# Patient Record
Sex: FEMALE | Race: WHITE | HISPANIC OR LATINO | Employment: UNEMPLOYED | URBAN - METROPOLITAN AREA
[De-identification: names, ages, dates, MRNs, and addresses within clinical notes are randomized per-mention and may not be internally consistent; named-entity substitution may affect disease eponyms.]

---

## 2018-11-13 ENCOUNTER — TELEPHONE (OUTPATIENT)
Dept: ADMINISTRATIVE | Facility: HOSPITAL | Age: 38
End: 2018-11-13

## 2019-10-10 PROBLEM — K80.20 CALCULUS OF GALLBLADDER WITHOUT CHOLECYSTITIS WITHOUT OBSTRUCTION: Status: ACTIVE | Noted: 2019-10-10

## 2019-10-25 PROBLEM — K80.20 CALCULUS OF GALLBLADDER WITHOUT CHOLECYSTITIS WITHOUT OBSTRUCTION: Status: RESOLVED | Noted: 2019-10-10 | Resolved: 2019-10-25

## 2020-01-30 ENCOUNTER — PATIENT OUTREACH (OUTPATIENT)
Dept: ADMINISTRATIVE | Facility: HOSPITAL | Age: 40
End: 2020-01-30

## 2021-06-02 ENCOUNTER — HOSPITAL ENCOUNTER (OUTPATIENT)
Dept: RADIOLOGY | Facility: HOSPITAL | Age: 41
Discharge: HOME | End: 2021-06-02
Attending: PREVENTIVE MEDICINE
Payer: OTHER MISCELLANEOUS

## 2021-06-02 DIAGNOSIS — M25.511 RIGHT SHOULDER PAIN: ICD-10-CM

## 2021-06-02 DIAGNOSIS — M25.511 RIGHT SHOULDER PAIN: Primary | ICD-10-CM

## 2021-06-02 PROCEDURE — 73221 MRI JOINT UPR EXTREM W/O DYE: CPT | Mod: TC,RT

## 2022-02-16 ENCOUNTER — PATIENT MESSAGE (OUTPATIENT)
Dept: ADMINISTRATIVE | Facility: HOSPITAL | Age: 42
End: 2022-02-16
Payer: OTHER MISCELLANEOUS

## 2022-04-04 ENCOUNTER — PATIENT MESSAGE (OUTPATIENT)
Dept: ADMINISTRATIVE | Facility: HOSPITAL | Age: 42
End: 2022-04-04
Payer: OTHER MISCELLANEOUS

## 2022-07-11 ENCOUNTER — PATIENT OUTREACH (OUTPATIENT)
Dept: ADMINISTRATIVE | Facility: HOSPITAL | Age: 42
End: 2022-07-11
Payer: OTHER MISCELLANEOUS

## 2022-07-11 ENCOUNTER — PATIENT MESSAGE (OUTPATIENT)
Dept: ADMINISTRATIVE | Facility: HOSPITAL | Age: 42
End: 2022-07-11
Payer: OTHER MISCELLANEOUS

## 2022-07-27 DIAGNOSIS — Z12.31 OTHER SCREENING MAMMOGRAM: ICD-10-CM

## 2022-08-03 ENCOUNTER — HOSPITAL ENCOUNTER (EMERGENCY)
Facility: HOSPITAL | Age: 42
Discharge: HOME | End: 2022-08-03
Attending: EMERGENCY MEDICINE

## 2022-08-03 VITALS
OXYGEN SATURATION: 98 % | TEMPERATURE: 98 F | RESPIRATION RATE: 18 BRPM | DIASTOLIC BLOOD PRESSURE: 72 MMHG | HEART RATE: 88 BPM | WEIGHT: 206.38 LBS | HEIGHT: 64 IN | SYSTOLIC BLOOD PRESSURE: 111 MMHG | BODY MASS INDEX: 35.23 KG/M2

## 2022-08-03 DIAGNOSIS — L50.9 URTICARIA: Primary | ICD-10-CM

## 2022-08-03 PROCEDURE — 25000003 PHARM REV CODE 250: Performed by: EMERGENCY MEDICINE

## 2022-08-03 PROCEDURE — 99283 EMERGENCY DEPT VISIT LOW MDM: CPT

## 2022-08-03 RX ORDER — CETIRIZINE HYDROCHLORIDE 10 MG/1
10 TABLET ORAL 2 TIMES DAILY PRN
Qty: 30 TABLET | Refills: 0 | Status: SHIPPED | OUTPATIENT
Start: 2022-08-03 | End: 2022-09-19

## 2022-08-03 RX ORDER — FAMOTIDINE 20 MG/1
20 TABLET, FILM COATED ORAL 2 TIMES DAILY PRN
Qty: 30 TABLET | Refills: 0 | Status: SHIPPED | OUTPATIENT
Start: 2022-08-03 | End: 2022-09-19

## 2022-08-03 RX ORDER — CETIRIZINE HYDROCHLORIDE 10 MG/1
20 TABLET ORAL
Status: COMPLETED | OUTPATIENT
Start: 2022-08-03 | End: 2022-08-03

## 2022-08-03 RX ORDER — FAMOTIDINE 20 MG/1
20 TABLET, FILM COATED ORAL
Status: COMPLETED | OUTPATIENT
Start: 2022-08-03 | End: 2022-08-03

## 2022-08-03 RX ADMIN — FAMOTIDINE 20 MG: 20 TABLET ORAL at 11:08

## 2022-08-03 RX ADMIN — CETIRIZINE HYDROCHLORIDE 20 MG: 10 TABLET, FILM COATED ORAL at 11:08

## 2022-08-04 NOTE — ED PROVIDER NOTES
EMERGENCY DEPARTMENT HISTORY AND PHYSICAL EXAM          Date: 8/3/2022   Patient Name: Kat Issa       History of Presenting Illness           Chief Complaint   Patient presents with    Itching     Pt complains of rash and itching that began on Sunday. Pt states that the itching has affected her entire body, reports that she began taking fluconazole w/o a prescription on Sunday due do vaginal yeast.         History Provided By: Patient and Partner    2300   Kat Issa is a 42 y.o. female with PMHX of T2DM  who presents to the emergency department C/O rash.      Patient reports intermittent itchy rash on extremities and trunk for the past 3-4 days.  Comes and goes.  Patient states symptoms started after taking fluconazole for a yeast infection.  No breathing difficulty, wheezing, vomiting, or throat tightness    PCP: Osiris Mason NP        No current facility-administered medications for this encounter.     Current Outpatient Medications   Medication Sig Dispense Refill    cetirizine (ZYRTEC) 10 MG tablet Take 1 tablet (10 mg total) by mouth 2 (two) times daily as needed (Hives). 30 tablet 0    famotidine (PEPCID) 20 MG tablet Take 1 tablet (20 mg total) by mouth 2 (two) times daily as needed (Hives). 30 tablet 0    ferrous sulfate 325 (65 FE) MG EC tablet Take 1 tablet (325 mg total) by mouth 2 (two) times daily. 60 tablet 5    lovastatin (MEVACOR) 20 MG tablet Take 1 tablet (20 mg total) by mouth every evening. 90 tablet 3    metFORMIN (GLUCOPHAGE) 1000 MG tablet Take 1 tablet (1,000 mg total) by mouth 2 (two) times daily with meals. 180 tablet 3           Past History     Past Medical History:   Past Medical History:   Diagnosis Date    Diabetes mellitus, type 2     Ectopic pregnancy x2 (s/p bilateral salpingectomy)     Gallstones         Past Surgical History:   Past Surgical History:   Procedure Laterality Date    DILATION AND CURETTAGE OF UTERUS  2015    ECTOPIC PREGNANCY  "SURGERY      2    LAPAROSCOPIC CHOLECYSTECTOMY N/A 10/10/2019    Procedure: CHOLECYSTECTOMY, LAPAROSCOPIC;  Surgeon: Luis Bogran-Reyes, MD;  Location: [facility];  Service: General;  Laterality: N/A;    left salpingectomy Left 2015    2nd ruptured ectopic ([facility] op note in media)    right salpingectomy Right 2014    1st ruptured ectopic, partial right salpingectomy ([facility] op note)        Family History:   Family History   Problem Relation Age of Onset    No Known Problems Mother     No Known Problems Father         Social History:   Social History     Tobacco Use    Smoking status: Never Smoker    Smokeless tobacco: Never Used   Substance Use Topics    Alcohol use: No    Drug use: No        Allergies:   Review of patient's allergies indicates:  No Known Allergies       Review of Systems   Review of Systems   Constitutional: Negative for chills and fever.   Respiratory: Negative for shortness of breath and wheezing.    Gastrointestinal: Negative for nausea and vomiting.   Skin: Positive for rash.   All other systems reviewed and are negative.               Physical Exam     Vitals:    08/03/22 2236 08/03/22 2240 08/03/22 2307   BP:  111/72    Pulse: 88     Resp: 18     Temp: 98.2 °F (36.8 °C)     SpO2: 98%     Weight: 93.6 kg (206 lb 6.4 oz)     Height:   5' 4" (1.626 m)      Physical Exam  Vitals and nursing note reviewed. Exam conducted with a chaperone present.   Constitutional:       General: She is not in acute distress.     Appearance: Normal appearance. She is not ill-appearing.   HENT:      Head: Normocephalic and atraumatic.      Right Ear: External ear normal.      Left Ear: External ear normal.      Nose: Nose normal. No congestion or rhinorrhea.      Mouth/Throat:      Mouth: Mucous membranes are moist.   Eyes:      Conjunctiva/sclera: Conjunctivae normal.      Pupils: Pupils are equal, round, and reactive to light.   Pulmonary:      Effort: Pulmonary effort is normal. No respiratory distress. "   Musculoskeletal:         General: No deformity. Normal range of motion.      Cervical back: Normal range of motion. No rigidity.   Skin:     General: Skin is dry.      Findings: Rash present.      Comments: Faint uticaria  over bilateral upper extremities, lower abdomen and lower back   Neurological:      General: No focal deficit present.      Mental Status: She is alert and oriented to person, place, and time. Mental status is at baseline.   Psychiatric:         Mood and Affect: Mood normal.         Behavior: Behavior normal.              Diagnostic Study Results      Labs -   No results found for this or any previous visit (from the past 12 hour(s)).     Radiologic Studies -    No orders to display        Medications given in the ED-   Medications   cetirizine tablet 20 mg (20 mg Oral Given 8/3/22 2305)   famotidine tablet 20 mg (20 mg Oral Given 8/3/22 2305)           Medical Decision Making    I am the first provider for this patient.     I reviewed the vital signs, available nursing notes, past medical history, past surgical history, family history and social history.     Vital Signs:  Reviewed the patient's vital signs.     Pulse Oximetry Analysis and Interpretation:    98% on Room Air, normal        Records Reviewed: Nursing notes.        Provider Notes (Medical Decision Making): Kat Issa is a 42 y.o. female here with hives    Vital signs with normal is.  Patient has not been taking any antihistamines or treatment for this at home.  Symptoms started after taking fluconazole.  Denies recent new allergens or exposures.    Advise treatment with second-generation H1 blocker can also add H2 blocker as needed.  Advised follow-up patient's primary care provider.        Procedures:   Procedures      ED Course:               Diagnosis and Disposition     Critical Care:      DISCHARGE NOTE:       Kat Issa's  results have been reviewed with her.  She has been counseled regarding her diagnosis,  treatment, and plan.  She verbally conveys understanding and agreement of the signs, symptoms, diagnosis, treatment and prognosis and additionally agrees to follow up as discussed.  She also agrees with the care-plan and conveys that all of her questions have been answered.  I have also provided discharge instructions for her that include: educational information regarding their diagnosis and treatment, and list of reasons why they would want to return to the ED prior to their follow-up appointment, should her condition change. She has been provided with education for proper emergency department utilization.         CLINICAL IMPRESSION:           1. Urticaria              PLAN:   1. Discharge Home  2.      Medication List      START taking these medications    cetirizine 10 MG tablet  Commonly known as: ZYRTEC  Take 1 tablet (10 mg total) by mouth 2 (two) times daily as needed (Hives).     famotidine 20 MG tablet  Commonly known as: PEPCID  Take 1 tablet (20 mg total) by mouth 2 (two) times daily as needed (Hives).        ASK your doctor about these medications    ferrous sulfate 325 (65 FE) MG EC tablet  Take 1 tablet (325 mg total) by mouth 2 (two) times daily.     lovastatin 20 MG tablet  Commonly known as: MEVACOR  Take 1 tablet (20 mg total) by mouth every evening.     metFORMIN 1000 MG tablet  Commonly known as: GLUCOPHAGE  Take 1 tablet (1,000 mg total) by mouth 2 (two) times daily with meals.           Where to Get Your Medications      These medications were sent to [address]  [address] [place] [address] [address]    Phone: [phone]   · cetirizine 10 MG tablet  · famotidine 20 MG tablet        3. Osiris Mason NP  [address] [place] [id number]  [phone]    Schedule an appointment as soon as possible for a visit   Primary care follow up       _______________________________     Please note that this dictation was completed with  M*Modal, the computer voice recognition software.  Quite often unanticipated grammatical, syntax, homophones, and other interpretive errors are inadvertently transcribed by the computer software.  Please disregard these errors.  Please excuse any errors that have escaped final proofreading.             Kain Jacobo MD  08/04/22 0132

## 2022-09-14 ENCOUNTER — PATIENT OUTREACH (OUTPATIENT)
Dept: ADMINISTRATIVE | Facility: HOSPITAL | Age: 42
End: 2022-09-14

## 2022-09-14 DIAGNOSIS — E11.9 CONTROLLED TYPE 2 DIABETES MELLITUS WITHOUT COMPLICATION, WITHOUT LONG-TERM CURRENT USE OF INSULIN: Primary | ICD-10-CM

## 2022-09-15 ENCOUNTER — LAB VISIT (OUTPATIENT)
Dept: LAB | Facility: HOSPITAL | Age: 42
End: 2022-09-15
Attending: NURSE PRACTITIONER

## 2022-09-15 DIAGNOSIS — E11.9 CONTROLLED TYPE 2 DIABETES MELLITUS WITHOUT COMPLICATION, WITHOUT LONG-TERM CURRENT USE OF INSULIN: ICD-10-CM

## 2022-09-15 DIAGNOSIS — D64.9 ANEMIA, UNSPECIFIED TYPE: ICD-10-CM

## 2022-09-15 LAB
ALBUMIN/CREAT UR: NORMAL MG/MG (ref 0–30)
BASOPHILS # BLD AUTO: 0.08 K/UL (ref 0–0.2)
BASOPHILS NFR BLD: 1.2 % (ref 0–1.9)
BILIRUB UR QL STRIP: NEGATIVE
CLARITY UR: CLEAR
COLOR UR: YELLOW
CREAT UR-MCNC: 102 MG/DL (ref 15–325)
DIFFERENTIAL METHOD: ABNORMAL
EOSINOPHIL # BLD AUTO: 0.3 K/UL (ref 0–0.5)
EOSINOPHIL NFR BLD: 3.7 % (ref 0–8)
ERYTHROCYTE [DISTWIDTH] IN BLOOD BY AUTOMATED COUNT: 17.2 % (ref 11.5–14.5)
ESTIMATED AVG GLUCOSE: 134 MG/DL (ref 68–131)
FERRITIN SERPL-MCNC: 8 NG/ML (ref 20–300)
FOLATE SERPL-MCNC: 28.4 NG/ML (ref 4–24)
GLUCOSE UR QL STRIP: NEGATIVE
HBA1C MFR BLD: 6.3 % (ref 4–5.6)
HCT VFR BLD AUTO: 31.2 % (ref 37–48.5)
HGB BLD-MCNC: 9.4 G/DL (ref 12–16)
HGB UR QL STRIP: NEGATIVE
IMM GRANULOCYTES # BLD AUTO: 0.01 K/UL (ref 0–0.04)
IMM GRANULOCYTES NFR BLD AUTO: 0.1 % (ref 0–0.5)
IRON SATN MFR SERPL: 6 % (ref 20–50)
IRON SERPL-MCNC: 23 UG/DL (ref 30–160)
KETONES UR QL STRIP: NEGATIVE
LEUKOCYTE ESTERASE UR QL STRIP: NEGATIVE
LYMPHOCYTES # BLD AUTO: 2 K/UL (ref 1–4.8)
LYMPHOCYTES NFR BLD: 30 % (ref 18–48)
MCH RBC QN AUTO: 22.1 PG (ref 27–31)
MCHC RBC AUTO-ENTMCNC: 30.1 G/DL (ref 32–36)
MCV RBC AUTO: 73 FL (ref 82–98)
MICROALBUMIN UR DL<=1MG/L-MCNC: <5 MG/L
MONOCYTES # BLD AUTO: 0.5 K/UL (ref 0.3–1)
MONOCYTES NFR BLD: 7.6 % (ref 4–15)
NEUTROPHILS # BLD AUTO: 3.9 K/UL (ref 1.8–7.7)
NEUTROPHILS NFR BLD: 57.4 % (ref 38–73)
NITRITE UR QL STRIP: NEGATIVE
NRBC BLD-RTO: 0 /100 WBC
PH UR STRIP: 8 [PH] (ref 5–8)
PLATELET # BLD AUTO: 310 K/UL (ref 150–450)
PMV BLD AUTO: 9.9 FL (ref 9.2–12.9)
PROT UR QL STRIP: NEGATIVE
RBC # BLD AUTO: 4.25 M/UL (ref 4–5.4)
SP GR UR STRIP: 1.01 (ref 1–1.03)
TOTAL IRON BINDING CAPACITY: 392 UG/DL (ref 250–450)
URN SPEC COLLECT METH UR: NORMAL
UROBILINOGEN UR STRIP-ACNC: NEGATIVE EU/DL
VIT B12 SERPL-MCNC: 691 PG/ML (ref 210–950)
WBC # BLD AUTO: 6.81 K/UL (ref 3.9–12.7)

## 2022-09-15 PROCEDURE — 82570 ASSAY OF URINE CREATININE: CPT | Performed by: NURSE PRACTITIONER

## 2022-09-15 PROCEDURE — 83036 HEMOGLOBIN GLYCOSYLATED A1C: CPT | Performed by: NURSE PRACTITIONER

## 2022-09-15 PROCEDURE — 85025 COMPLETE CBC W/AUTO DIFF WBC: CPT | Performed by: NURSE PRACTITIONER

## 2022-09-15 PROCEDURE — 81003 URINALYSIS AUTO W/O SCOPE: CPT | Performed by: NURSE PRACTITIONER

## 2022-09-15 PROCEDURE — 36415 COLL VENOUS BLD VENIPUNCTURE: CPT | Performed by: NURSE PRACTITIONER

## 2022-09-15 PROCEDURE — 82607 VITAMIN B-12: CPT | Performed by: NURSE PRACTITIONER

## 2022-09-15 PROCEDURE — 82746 ASSAY OF FOLIC ACID SERUM: CPT | Performed by: NURSE PRACTITIONER

## 2022-09-15 PROCEDURE — 83540 ASSAY OF IRON: CPT | Performed by: NURSE PRACTITIONER

## 2022-09-15 PROCEDURE — 82728 ASSAY OF FERRITIN: CPT | Performed by: NURSE PRACTITIONER

## 2022-12-28 ENCOUNTER — PATIENT OUTREACH (OUTPATIENT)
Dept: ADMINISTRATIVE | Facility: HOSPITAL | Age: 42
End: 2022-12-28

## 2022-12-28 NOTE — PROGRESS NOTES
Chart reviewed, immunization record updated.  No new results noted on [facility] or [facility] web site.  Care Everywhere updated.   Patient care coordination note  Next PCP visit 03/20/2023  LOV with PCP 09/19/2022  Rescheduled DM eye exam

## 2023-01-09 ENCOUNTER — PATIENT MESSAGE (OUTPATIENT)
Dept: ADMINISTRATIVE | Facility: HOSPITAL | Age: 43
End: 2023-01-09

## 2023-03-01 DIAGNOSIS — E11.9 TYPE 2 DIABETES MELLITUS WITHOUT COMPLICATION: ICD-10-CM

## 2023-03-17 ENCOUNTER — HOSPITAL ENCOUNTER (EMERGENCY)
Facility: HOSPITAL | Age: 43
Discharge: HOME | End: 2023-03-17
Attending: EMERGENCY MEDICINE

## 2023-03-17 VITALS
RESPIRATION RATE: 18 BRPM | DIASTOLIC BLOOD PRESSURE: 53 MMHG | SYSTOLIC BLOOD PRESSURE: 104 MMHG | HEART RATE: 85 BPM | HEIGHT: 64 IN | WEIGHT: 221 LBS | OXYGEN SATURATION: 99 % | TEMPERATURE: 99 F | BODY MASS INDEX: 37.73 KG/M2

## 2023-03-17 DIAGNOSIS — Z20.822 EXPOSURE TO COVID-19 VIRUS: Primary | ICD-10-CM

## 2023-03-17 LAB
CTP QC/QA: YES
SARS-COV-2 RDRP RESP QL NAA+PROBE: NEGATIVE

## 2023-03-17 PROCEDURE — 99282 EMERGENCY DEPT VISIT SF MDM: CPT

## 2023-03-17 NOTE — ED PROVIDER NOTES
Encounter Date: 3/17/2023       History     Chief Complaint   Patient presents with    COVID-19 Concerns     Asymptomatic covid exposure.      42 yr old female presents to ER for covid swab. Asymptomatic, covid exposure.    Review of patient's allergies indicates:  No Known Allergies  Past Medical History:   Diagnosis Date    Diabetes mellitus, type 2     Ectopic pregnancy x2 (s/p bilateral salpingectomy)     Gallstones      Past Surgical History:   Procedure Laterality Date    DILATION AND CURETTAGE OF UTERUS  2015    ECTOPIC PREGNANCY SURGERY      2    LAPAROSCOPIC CHOLECYSTECTOMY N/A 10/10/2019    Procedure: CHOLECYSTECTOMY, LAPAROSCOPIC;  Surgeon: Luis Bogran-Reyes, MD;  Location: [facility];  Service: General;  Laterality: N/A;    left salpingectomy Left 2015    2nd ruptured ectopic ([facility] op note in media)    right salpingectomy Right 2014    1st ruptured ectopic, partial right salpingectomy ([facility] op note)     Family History   Problem Relation Age of Onset    No Known Problems Mother     No Known Problems Father      Social History     Tobacco Use    Smoking status: Never    Smokeless tobacco: Never   Substance Use Topics    Alcohol use: No    Drug use: No     Review of Systems   Constitutional:  Negative for fever.   HENT:  Negative for sore throat.    Respiratory:  Negative for shortness of breath.    Cardiovascular:  Negative for chest pain.   Gastrointestinal:  Negative for nausea.   Genitourinary:  Negative for dysuria.   Musculoskeletal:  Negative for back pain.   Skin:  Negative for rash.   Neurological:  Negative for weakness.   Hematological:  Does not bruise/bleed easily.   All other systems reviewed and are negative.    Physical Exam     Initial Vitals [03/17/23 1506]   BP Pulse Resp Temp SpO2   (!) 104/53 85 18 98.6 °F (37 °C) 99 %      MAP       --         Physical Exam    Nursing note and vitals reviewed.  Constitutional: She appears well-developed and well-nourished.   HENT:   Head:  Normocephalic and atraumatic.   Eyes: Pupils are equal, round, and reactive to light.   Neck:   Normal range of motion.  Cardiovascular:  Normal rate and regular rhythm.           Pulmonary/Chest: Breath sounds normal.   Abdominal: Abdomen is soft. Bowel sounds are normal.   Musculoskeletal:         General: Normal range of motion.      Cervical back: Normal range of motion.     Neurological: She is alert and oriented to person, place, and time.   Skin: Skin is warm and dry.   Psychiatric: She has a normal mood and affect.       ED Course   Procedures  Labs Reviewed   SARS-COV-2 RDRP GENE          Imaging Results    None          Medications - No data to display  Medical Decision Making:   Differential Diagnosis:   Covid, exposure to covid    Clinical Tests:   Lab Tests: Ordered and Reviewed                        Clinical Impression:   Final diagnoses:  [Z20.822] Exposure to COVID-19 virus (Primary)        ED Disposition Condition    Discharge Stable          ED Prescriptions    None       Follow-up Information       Follow up With Specialties Details Why Contact Info    Osiris Mason NP Family Medicine  As needed [address] [facility] [address]  [phone]               Vivian Rosa NP  03/17/23 153

## 2023-04-06 ENCOUNTER — PATIENT OUTREACH (OUTPATIENT)
Dept: ADMINISTRATIVE | Facility: HOSPITAL | Age: 43
End: 2023-04-06

## 2023-07-10 ENCOUNTER — PATIENT MESSAGE (OUTPATIENT)
Dept: ADMINISTRATIVE | Facility: HOSPITAL | Age: 43
End: 2023-07-10

## 2023-09-21 ENCOUNTER — HOSPITAL ENCOUNTER (EMERGENCY)
Facility: HOSPITAL | Age: 43
Discharge: HOME | End: 2023-09-21
Attending: EMERGENCY MEDICINE

## 2023-09-21 VITALS
TEMPERATURE: 98 F | BODY MASS INDEX: 38.24 KG/M2 | OXYGEN SATURATION: 99 % | SYSTOLIC BLOOD PRESSURE: 142 MMHG | WEIGHT: 224 LBS | RESPIRATION RATE: 20 BRPM | HEIGHT: 64 IN | HEART RATE: 88 BPM | DIASTOLIC BLOOD PRESSURE: 80 MMHG

## 2023-09-21 DIAGNOSIS — R03.0 ELEVATED BLOOD PRESSURE READING: ICD-10-CM
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
## 2023-09-21 PROCEDURE — 99283 EMERGENCY DEPT VISIT LOW MDM: CPT

## 2023-09-21 PROCEDURE — 25000003 PHARM REV CODE 250: Performed by: EMERGENCY MEDICINE

## 2023-09-21 RX ORDER — METHOCARBAMOL 500 MG/1
1000 TABLET, FILM COATED ORAL 3 TIMES DAILY
Qty: 30 TABLET | Refills: 0 | Status: SHIPPED | OUTPATIENT
Start: 2023-09-21 | End: 2023-09-26

## 2023-09-21 RX ORDER — HYDROCODONE BITARTRATE AND ACETAMINOPHEN 10; 325 MG/1; MG/1
1 TABLET ORAL
Status: COMPLETED | OUTPATIENT
Start: 2023-09-21 | End: 2023-09-21

## 2023-09-21 RX ADMIN — HYDROCODONE BITARTRATE AND ACETAMINOPHEN 1 TABLET: 10; 325 TABLET ORAL at 01:09
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
## 2023-11-06 ENCOUNTER — HOSPITAL ENCOUNTER (EMERGENCY)
Facility: HOSPITAL | Age: 43
Discharge: HOME | End: 2023-11-06
Attending: EMERGENCY MEDICINE

## 2023-11-06 VITALS
SYSTOLIC BLOOD PRESSURE: 124 MMHG | HEIGHT: 64 IN | BODY MASS INDEX: 37.9 KG/M2 | TEMPERATURE: 98 F | DIASTOLIC BLOOD PRESSURE: 71 MMHG | RESPIRATION RATE: 18 BRPM | OXYGEN SATURATION: 96 % | WEIGHT: 222 LBS | HEART RATE: 82 BPM

## 2023-11-06 DIAGNOSIS — L50.9 URTICARIA: Primary | ICD-10-CM

## 2023-11-06 PROCEDURE — 25000003 PHARM REV CODE 250: Performed by: NURSE PRACTITIONER

## 2023-11-06 PROCEDURE — 96372 THER/PROPH/DIAG INJ SC/IM: CPT | Performed by: NURSE PRACTITIONER

## 2023-11-06 PROCEDURE — 63600175 PHARM REV CODE 636 W HCPCS: Performed by: NURSE PRACTITIONER

## 2023-11-06 PROCEDURE — 99284 EMERGENCY DEPT VISIT MOD MDM: CPT

## 2023-11-06 RX ORDER — HYDROXYZINE HYDROCHLORIDE 25 MG/1
25 TABLET, FILM COATED ORAL EVERY 8 HOURS PRN
Qty: 9 TABLET | Refills: 0 | Status: SHIPPED | OUTPATIENT
Start: 2023-11-06 | End: 2023-11-09

## 2023-11-06 RX ORDER — FAMOTIDINE 20 MG/1
20 TABLET, FILM COATED ORAL 2 TIMES DAILY
Qty: 8 TABLET | Refills: 0 | Status: SHIPPED | OUTPATIENT
Start: 2023-11-06 | End: 2023-11-10

## 2023-11-06 RX ORDER — PREDNISONE 20 MG/1
20 TABLET ORAL DAILY
Qty: 5 TABLET | Refills: 0 | Status: SHIPPED | OUTPATIENT
Start: 2023-11-06 | End: 2023-11-11

## 2023-11-06 RX ORDER — FAMOTIDINE 20 MG/1
20 TABLET, FILM COATED ORAL
Status: COMPLETED | OUTPATIENT
Start: 2023-11-06 | End: 2023-11-06

## 2023-11-06 RX ORDER — DEXAMETHASONE SODIUM PHOSPHATE 4 MG/ML
8 INJECTION, SOLUTION INTRA-ARTICULAR; INTRALESIONAL; INTRAMUSCULAR; INTRAVENOUS; SOFT TISSUE
Status: COMPLETED | OUTPATIENT
Start: 2023-11-06 | End: 2023-11-06

## 2023-11-06 RX ADMIN — FAMOTIDINE 20 MG: 20 TABLET, FILM COATED ORAL at 02:11

## 2023-11-06 RX ADMIN — DEXAMETHASONE SODIUM PHOSPHATE 8 MG: 4 INJECTION INTRA-ARTICULAR; INTRALESIONAL; INTRAMUSCULAR; INTRAVENOUS; SOFT TISSUE at 02:11

## 2023-11-06 NOTE — ED PROVIDER NOTES
Encounter Date: 11/6/2023       History     Chief Complaint   Patient presents with    Rash     Rash with nausea, headache, subjective fever x 2 days.     This is a 43-year-old white female with a history of diabetes mellitus type 2 who presents to the emergency department with complaints of itchy rash that began yesterday.  She reports raised, itchy rash varying in intensity and location since onset.  She also reports mild nausea and chills at times.  She denies known exposure to irritants or change in products/detergents.      Review of patient's allergies indicates:  No Known Allergies  Past Medical History:   Diagnosis Date    Diabetes mellitus, type 2     Ectopic pregnancy x2 (s/p bilateral salpingectomy)     Gallstones      Past Surgical History:   Procedure Laterality Date    DILATION AND CURETTAGE OF UTERUS  2015    ECTOPIC PREGNANCY SURGERY      2    LAPAROSCOPIC CHOLECYSTECTOMY N/A 10/10/2019    Procedure: CHOLECYSTECTOMY, LAPAROSCOPIC;  Surgeon: Luis Bogran-Reyes, MD;  Location: [facility];  Service: General;  Laterality: N/A;    left salpingectomy Left 2015    2nd ruptured ectopic ([facility] op note in media)    right salpingectomy Right 2014    1st ruptured ectopic, partial right salpingectomy ([facility] op note)     Family History   Problem Relation Age of Onset    No Known Problems Mother     No Known Problems Father      Social History     Tobacco Use    Smoking status: Never    Smokeless tobacco: Never   Substance Use Topics    Alcohol use: No    Drug use: No     Review of Systems   Constitutional:  Positive for chills. Negative for appetite change and fever.   HENT:  Negative for congestion, rhinorrhea and sore throat.    Respiratory:  Negative for cough and shortness of breath.    Gastrointestinal:  Positive for nausea. Negative for vomiting.   Skin:  Positive for rash.       Physical Exam     Initial Vitals [11/06/23 1354]   BP Pulse Resp Temp SpO2   124/71 86 18 98.3 °F (36.8 °C) 95 %      MAP       --          Physical Exam    Nursing note and vitals reviewed.  Constitutional: She appears well-developed and well-nourished. She is active. No distress.   HENT:   Head: Normocephalic and atraumatic.   Eyes: EOM are normal. Pupils are equal, round, and reactive to light.   Neck: Neck supple.   Normal range of motion.  Cardiovascular:  Normal rate.           Pulmonary/Chest: No respiratory distress.   Musculoskeletal:      Cervical back: Normal range of motion and neck supple.     Neurological: She is alert and oriented to person, place, and time. GCS score is 15. GCS eye subscore is 4. GCS verbal subscore is 5. GCS motor subscore is 6.   Skin: Skin is warm and dry. Capillary refill takes less than 2 seconds. Rash noted.   Raised, confluent rash noted primarily along neck and upper chest wall and sparsely located on upper and lower extremities, consistent with urticaria   Psychiatric: She has a normal mood and affect. Her behavior is normal. Thought content normal.         ED Course   Procedures  Labs Reviewed - No data to display       Imaging Results    None          Medications   dexAMETHasone injection 8 mg (8 mg Intramuscular Given 11/6/23 1406)   famotidine tablet 20 mg (20 mg Oral Given 11/6/23 1406)     Medical Decision Making  Risk  Prescription drug management.                               Clinical Impression:   Final diagnoses:  [L50.9] Urticaria (Primary)        ED Disposition Condition    Discharge Stable          ED Prescriptions       Medication Sig Dispense Start Date End Date Auth. Provider    predniSONE (DELTASONE) 20 MG tablet Take 1 tablet (20 mg total) by mouth once daily. for 5 days 5 tablet 11/6/2023 11/11/2023 Elham Kumari, NP    hydrOXYzine HCL (ATARAX) 25 MG tablet Take 1 tablet (25 mg total) by mouth every 8 (eight) hours as needed for Itching. 9 tablet 11/6/2023 11/9/2023 Elham Kumari NP    famotidine (PEPCID) 20 MG tablet Take 1 tablet (20 mg total) by mouth 2 (two) times daily.  for 4 days 8 tablet 11/6/2023 11/10/2023 Elham Kumari NP          Follow-up Information       Follow up With Specialties Details Why Contact Info    Liset Andino NP Family Medicine Schedule an appointment as soon as possible for a visit in 2 days for re-evaluation of today's complaint [address] [facility] [address]  [phone]               Elham Kumari NP  11/06/23 1412

## 2023-11-06 NOTE — Clinical Note
"Kat Randolphkaylin Issa was seen and treated in our emergency department on 11/6/2023.  She may return to work on 11/07/2023.       If you have any questions or concerns, please don't hesitate to call.      Elham Kumari, NP"

## 2023-11-06 NOTE — DISCHARGE INSTRUCTIONS
Use medications as directed.  Avoid temperature extremes such as hot showers.  Follow-up with your doctor in 1-2 days and return to the emergency room for worsening condition.

## 2023-11-29 DIAGNOSIS — E11.9 TYPE 2 DIABETES MELLITUS WITHOUT COMPLICATION: ICD-10-CM

## 2025-03-06 ENCOUNTER — PATIENT OUTREACH (OUTPATIENT)
Facility: OTHER | Age: 45
End: 2025-03-06

## 2025-03-07 NOTE — PROGRESS NOTES
ED Navigator attempted to contact patient on 2 separate occasions; patient is unable to reach. ED Navigator to close encounter at this time.    Micki Vela  ED Navigator  [phone]

## 2025-07-17 DIAGNOSIS — E11.9 TYPE 2 DIABETES MELLITUS WITHOUT COMPLICATION: ICD-10-CM
